# Patient Record
Sex: MALE | Race: WHITE | NOT HISPANIC OR LATINO
[De-identification: names, ages, dates, MRNs, and addresses within clinical notes are randomized per-mention and may not be internally consistent; named-entity substitution may affect disease eponyms.]

---

## 2020-02-24 ENCOUNTER — APPOINTMENT (OUTPATIENT)
Dept: CARDIOLOGY | Facility: CLINIC | Age: 55
End: 2020-02-24

## 2020-02-24 PROBLEM — Z00.00 ENCOUNTER FOR PREVENTIVE HEALTH EXAMINATION: Status: ACTIVE | Noted: 2020-02-24

## 2020-06-01 ENCOUNTER — APPOINTMENT (OUTPATIENT)
Dept: NEUROLOGY | Facility: CLINIC | Age: 55
End: 2020-06-01

## 2021-03-05 ENCOUNTER — APPOINTMENT (OUTPATIENT)
Dept: NEUROLOGY | Facility: CLINIC | Age: 56
End: 2021-03-05
Payer: COMMERCIAL

## 2021-03-05 VITALS
HEIGHT: 75 IN | DIASTOLIC BLOOD PRESSURE: 80 MMHG | WEIGHT: 215 LBS | BODY MASS INDEX: 26.73 KG/M2 | TEMPERATURE: 97.9 F | SYSTOLIC BLOOD PRESSURE: 126 MMHG

## 2021-03-05 PROCEDURE — 99204 OFFICE O/P NEW MOD 45 MIN: CPT

## 2021-03-05 PROCEDURE — 99072 ADDL SUPL MATRL&STAF TM PHE: CPT

## 2021-03-16 ENCOUNTER — NON-APPOINTMENT (OUTPATIENT)
Age: 56
End: 2021-03-16

## 2021-03-26 ENCOUNTER — APPOINTMENT (OUTPATIENT)
Dept: NEUROLOGY | Facility: CLINIC | Age: 56
End: 2021-03-26
Payer: COMMERCIAL

## 2021-03-26 VITALS — HEIGHT: 75 IN | WEIGHT: 210 LBS | TEMPERATURE: 98.3 F | BODY MASS INDEX: 26.11 KG/M2

## 2021-03-26 DIAGNOSIS — G44.89 OTHER HEADACHE SYNDROME: ICD-10-CM

## 2021-03-26 DIAGNOSIS — G62.9 POLYNEUROPATHY, UNSPECIFIED: ICD-10-CM

## 2021-03-26 DIAGNOSIS — R20.2 PARESTHESIA OF SKIN: ICD-10-CM

## 2021-03-26 PROCEDURE — 99072 ADDL SUPL MATRL&STAF TM PHE: CPT

## 2021-03-26 PROCEDURE — 99214 OFFICE O/P EST MOD 30 MIN: CPT

## 2021-04-12 ENCOUNTER — NON-APPOINTMENT (OUTPATIENT)
Age: 56
End: 2021-04-12

## 2021-05-11 ENCOUNTER — APPOINTMENT (OUTPATIENT)
Dept: NEUROLOGY | Facility: CLINIC | Age: 56
End: 2021-05-11

## 2021-07-14 ENCOUNTER — NON-APPOINTMENT (OUTPATIENT)
Age: 56
End: 2021-07-14

## 2021-07-14 ENCOUNTER — APPOINTMENT (OUTPATIENT)
Dept: CARDIOLOGY | Facility: CLINIC | Age: 56
End: 2021-07-14
Payer: COMMERCIAL

## 2021-07-14 VITALS
SYSTOLIC BLOOD PRESSURE: 118 MMHG | HEIGHT: 75 IN | WEIGHT: 210 LBS | RESPIRATION RATE: 16 BRPM | HEART RATE: 56 BPM | DIASTOLIC BLOOD PRESSURE: 77 MMHG | BODY MASS INDEX: 26.11 KG/M2

## 2021-07-14 DIAGNOSIS — Z82.49 FAMILY HISTORY OF ISCHEMIC HEART DISEASE AND OTHER DISEASES OF THE CIRCULATORY SYSTEM: ICD-10-CM

## 2021-07-14 DIAGNOSIS — R42 DIZZINESS AND GIDDINESS: ICD-10-CM

## 2021-07-14 DIAGNOSIS — R06.02 SHORTNESS OF BREATH: ICD-10-CM

## 2021-07-14 DIAGNOSIS — Z72.89 OTHER PROBLEMS RELATED TO LIFESTYLE: ICD-10-CM

## 2021-07-14 PROCEDURE — 99072 ADDL SUPL MATRL&STAF TM PHE: CPT

## 2021-07-14 PROCEDURE — 93000 ELECTROCARDIOGRAM COMPLETE: CPT

## 2021-07-14 PROCEDURE — 99204 OFFICE O/P NEW MOD 45 MIN: CPT

## 2021-07-14 NOTE — HISTORY OF PRESENT ILLNESS
[FreeTextEntry1] : Patient presents the office today because he is recently been experiencing more palpitations.  He states that his concerns regarding this began about a year ago when he had some issues with shortness of breath.  He went to the ER in March and was evaluated and sent home.  2 weeks later he had issues with shortness of breath again and went back to the ER and work-up was again negative.  After this he had seen a cardiologist who did a nuclear stress test which was negative and he actually was feeling well.  Over the last few weeks he has been having more predominantly palpitations.  It started with heart racing in the middle of the night and since then has been occurring intermittently at random times.  Sometimes at night and sometimes during the day.  It last for up to 30 seconds.  It is occurring up to 1 or 2 times a day.  No dizziness or lightheadedness.  He does however report some epigastric tightness or discomfort at times.  This also occurs at random and is not related to the palpitations.  He has no real exertional symptoms.  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  He does get some sweating at night as well.  Patient denies shortness of breath, orthopnea, presyncope, syncope.

## 2021-07-14 NOTE — DISCUSSION/SUMMARY
[FreeTextEntry1] : 1.  Check echocardiogram and stress echocardiogram to evaluate his epigastric discomfort as well as with palpitations.\par 2.  Check 48-hour Holter monitor to further evaluate his palpitations and rule out atrial fibrillation.\par 3.  He will have blood work done.\par 4.  No additional cardiac medications at this time.\par 5.  Encourage patient to avoid caffeine.\par 6.  Follow up here after testing, and will make further recommendations at that time.

## 2021-07-14 NOTE — PHYSICAL EXAM
[Well Developed] : well developed [Well Nourished] : well nourished [No Acute Distress] : no acute distress [Normal Conjunctiva] : normal conjunctiva [Normal Venous Pressure] : normal venous pressure [No Carotid Bruit] : no carotid bruit [Normal S1, S2] : normal S1, S2 [No Murmur] : no murmur [No Rub] : no rub [No Gallop] : no gallop [Clear Lung Fields] : clear lung fields [Good Air Entry] : good air entry [No Respiratory Distress] : no respiratory distress  [Soft] : abdomen soft [Non Tender] : non-tender [No Masses/organomegaly] : no masses/organomegaly [Normal Bowel Sounds] : normal bowel sounds [Normal Gait] : normal gait [No Edema] : no edema [No Cyanosis] : no cyanosis [No Clubbing] : no clubbing [No Varicosities] : no varicosities [Moves all extremities] : moves all extremities [No Focal Deficits] : no focal deficits [Normal Speech] : normal speech [Alert and Oriented] : alert and oriented [Normal memory] : normal memory [de-identified] : Varicose veins noted

## 2021-07-14 NOTE — ASSESSMENT
[FreeTextEntry1] : EKG: Sinus rhythm with no significant ST or T wave changes.\par \par 56-year-old man with no significant past medical history but a family history of atrial fibrillation who presents to me for evaluation of predominantly palpitations but also some epigastric discomfort.  Epigastric discomfort is atypical and more likely related to a GI issue or possibly musculoskeletal given his physical job.  His palpitations could certainly represent atrial fibrillation or another arrhythmia and this will need to be ruled out and further evaluated.  EKG today is unremarkable and in sinus rhythm.  I will also do some baseline blood work on him.  Blood pressure is normal here today.

## 2021-07-28 ENCOUNTER — APPOINTMENT (OUTPATIENT)
Dept: CARDIOLOGY | Facility: CLINIC | Age: 56
End: 2021-07-28
Payer: COMMERCIAL

## 2021-07-28 PROCEDURE — 93306 TTE W/DOPPLER COMPLETE: CPT

## 2021-08-04 ENCOUNTER — APPOINTMENT (OUTPATIENT)
Dept: CARDIOLOGY | Facility: CLINIC | Age: 56
End: 2021-08-04
Payer: COMMERCIAL

## 2021-08-04 PROCEDURE — 93351 STRESS TTE COMPLETE: CPT

## 2021-08-04 RX ORDER — PERFLUTREN 6.52 MG/ML
6.52 INJECTION, SUSPENSION INTRAVENOUS
Qty: 4 | Refills: 0 | Status: COMPLETED | OUTPATIENT
Start: 2021-08-04

## 2021-08-04 RX ADMIN — PERFLUTREN MG/ML: 6.52 INJECTION, SUSPENSION INTRAVENOUS at 00:00

## 2021-08-09 ENCOUNTER — APPOINTMENT (OUTPATIENT)
Dept: CARDIOLOGY | Facility: CLINIC | Age: 56
End: 2021-08-09
Payer: COMMERCIAL

## 2021-08-09 VITALS
HEIGHT: 75 IN | WEIGHT: 205 LBS | OXYGEN SATURATION: 98 % | SYSTOLIC BLOOD PRESSURE: 157 MMHG | RESPIRATION RATE: 16 BRPM | BODY MASS INDEX: 25.49 KG/M2 | HEART RATE: 63 BPM | DIASTOLIC BLOOD PRESSURE: 85 MMHG

## 2021-08-09 DIAGNOSIS — I51.7 CARDIOMEGALY: ICD-10-CM

## 2021-08-09 DIAGNOSIS — R03.0 ELEVATED BLOOD-PRESSURE READING, W/OUT DIAGNOSIS OF HYPERTENSION: ICD-10-CM

## 2021-08-09 DIAGNOSIS — R07.9 CHEST PAIN, UNSPECIFIED: ICD-10-CM

## 2021-08-09 DIAGNOSIS — R00.2 PALPITATIONS: ICD-10-CM

## 2021-08-09 PROCEDURE — 99214 OFFICE O/P EST MOD 30 MIN: CPT

## 2021-08-09 NOTE — PHYSICAL EXAM
[Well Developed] : well developed [Well Nourished] : well nourished [No Acute Distress] : no acute distress [Normal Conjunctiva] : normal conjunctiva [Normal Venous Pressure] : normal venous pressure [No Carotid Bruit] : no carotid bruit [Normal S1, S2] : normal S1, S2 [No Murmur] : no murmur [No Rub] : no rub [No Gallop] : no gallop [Clear Lung Fields] : clear lung fields [Good Air Entry] : good air entry [No Respiratory Distress] : no respiratory distress  [Soft] : abdomen soft [Non Tender] : non-tender [No Masses/organomegaly] : no masses/organomegaly [Normal Bowel Sounds] : normal bowel sounds [Normal Gait] : normal gait [No Edema] : no edema [No Cyanosis] : no cyanosis [No Clubbing] : no clubbing [No Varicosities] : no varicosities [Moves all extremities] : moves all extremities [No Focal Deficits] : no focal deficits [Normal Speech] : normal speech [Alert and Oriented] : alert and oriented [Normal memory] : normal memory [de-identified] : Varicose veins noted

## 2021-08-09 NOTE — ASSESSMENT
[FreeTextEntry1] : Holter monitor July 14, 2021 demonstrated a presenting rhythm of sinus with an average heart rate of 65 bpm, maximum 101, minimum 46 bpm.  Approximately 1 PVC and 1 PAC per hour were noted.  No significant arrhythmia.  Multiple patient events of palpitations correlated with all sinus rhythm.\par \par Echocardiogram July 28, 2021 demonstrated left ventricle normal size and function with ejection fraction of 60 to 65%.  Borderline concentric LVH noted.  Mildly dilated atrium.  Borderline dilated aortic root.\par \par Exercise stress echocardiogram performed August 4, 2021 during which the patient exercised via a Jamel protocol for 10 minutes and 10 seconds, totaling 11 METS.  Peak heart rate achieved was 151 bpm, representing 91% of a stricter maximum.  Blood pressure response was normal.  EKG showed no evidence of ischemia.  Evaluation of echocardiographic images showed a normal baseline with normal augmentation at stress and no evidence of ischemia or infarct.\par \par Presented 56-year-old man with no significant past medical history but a family history of atrial fibrillation who presented to me for evaluation of predominantly palpitations but also some epigastric discomfort.  Patient is feeling about the same although may be a little bit better with regards to both his discomfort and his palpitations.  Cardiac testing is fairly unremarkable in this regard.  Holter monitor shows no evidence of significant arrhythmia despite his having palpitations while wearing it.  Echocardiogram shows a normal EF.  He does have some borderline findings that may be consistent with hypertensive heart disease although may be totally normal for this patient.  Stress echocardiogram shows no ischemia or infarct and a very good exercise capacity.  I have asked him to start monitoring his blood pressure at home to ensure there is not elevated.  Assuming it is not he is otherwise healthy and excellent shape.  His lipids are very well controlled there is no evidence of diabetes.

## 2021-08-09 NOTE — DISCUSSION/SUMMARY
[FreeTextEntry1] : 1.  No additional cardiac testing at this time.\par 2.  No additional cardiac medications at this time.\par 3.  Encourage patient to avoid caffeine.\par 4.  Patient is encouraged to exercise at least 30 minutes a day everyday of the week.\par 5.  Encourage patient to maintain his healthy diet habits.\par 6.  Monitor BP at home, keep a log and bring to f/u.\par 7.  Follow up here in three months.

## 2021-08-09 NOTE — HISTORY OF PRESENT ILLNESS
[FreeTextEntry1] : Patient presents back today noting he is still having the same epigastric discomfort he was having when I last saw him although it may be a bit less frequent.  He still gets some intermittent palpitations but these also do not seem to be causing him as much trouble.  He reports no other new symptoms at this time.  Patient denies shortness of breath, orthopnea, presyncope, syncope.

## 2021-12-09 ENCOUNTER — APPOINTMENT (OUTPATIENT)
Dept: CARDIOLOGY | Facility: CLINIC | Age: 56
End: 2021-12-09